# Patient Record
Sex: MALE | Race: WHITE | NOT HISPANIC OR LATINO | URBAN - METROPOLITAN AREA
[De-identification: names, ages, dates, MRNs, and addresses within clinical notes are randomized per-mention and may not be internally consistent; named-entity substitution may affect disease eponyms.]

---

## 2017-09-28 ENCOUNTER — FOLLOW UP ESTABLISHED (OUTPATIENT)
Dept: URBAN - METROPOLITAN AREA CLINIC 24 | Facility: CLINIC | Age: 67
End: 2017-09-28
Payer: COMMERCIAL

## 2017-09-28 DIAGNOSIS — H52.03 HYPERMETROPIA, BILATERAL: ICD-10-CM

## 2017-09-28 DIAGNOSIS — H25.813 COMBINED FORMS OF AGE-RELATED CATARACT, BILATERAL: Primary | ICD-10-CM

## 2017-09-28 PROCEDURE — 92014 COMPRE OPH EXAM EST PT 1/>: CPT | Performed by: OPTOMETRIST

## 2017-09-28 PROCEDURE — 92015 DETERMINE REFRACTIVE STATE: CPT | Performed by: OPTOMETRIST

## 2017-09-28 ASSESSMENT — VISUAL ACUITY
OS: 20/20
OD: 20/25

## 2017-09-28 ASSESSMENT — KERATOMETRY
OS: 44.59
OD: 44.09

## 2017-09-28 ASSESSMENT — INTRAOCULAR PRESSURE
OD: 16
OS: 16

## 2021-12-21 ENCOUNTER — OFFICE VISIT (OUTPATIENT)
Dept: URBAN - METROPOLITAN AREA CLINIC 34 | Facility: CLINIC | Age: 71
End: 2021-12-21
Payer: COMMERCIAL

## 2021-12-21 PROCEDURE — 99204 OFFICE O/P NEW MOD 45 MIN: CPT | Performed by: OPHTHALMOLOGY

## 2021-12-21 PROCEDURE — 92285 EXTERNAL OCULAR PHOTOGRAPHY: CPT | Performed by: OPHTHALMOLOGY

## 2021-12-21 RX ORDER — NEOMYCIN SULFATE, POLYMYXIN B SULFATE AND DEXAMETHASONE 3.5; 10000; 1 MG/G; [USP'U]/G; MG/G
OINTMENT OPHTHALMIC
Qty: 1 | Refills: 1 | Status: ACTIVE
Start: 2021-12-21

## 2021-12-21 NOTE — IMPRESSION/PLAN
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 Plan: The patient demonstrates hamilton tarsal entropion, with lashes abrading the cornea/globe, creating a mechanical keratoconjunctivitis. To resolve this, I recommended entropion reinsertion of the lower lid retractors via conjunctivoplasty w/ rearrangement along with a ATR<10 sq cm to elevate/tighten/stabilize the lower lid, and prevent recurrence of entropion.

## 2022-02-15 ENCOUNTER — ADULT PHYSICAL (OUTPATIENT)
Dept: URBAN - METROPOLITAN AREA CLINIC 24 | Facility: CLINIC | Age: 72
End: 2022-02-15
Payer: COMMERCIAL

## 2022-02-15 DIAGNOSIS — Z01.818 ENCOUNTER FOR OTHER PREPROCEDURAL EXAMINATION: Primary | ICD-10-CM

## 2022-02-15 DIAGNOSIS — H02.032 SENILE ENTROPION OF RIGHT LOWER EYELID: ICD-10-CM

## 2022-02-15 PROCEDURE — 99202 OFFICE O/P NEW SF 15 MIN: CPT | Performed by: PHYSICIAN ASSISTANT

## 2022-03-03 ENCOUNTER — SURGERY (OUTPATIENT)
Dept: URBAN - METROPOLITAN AREA SURGERY 12 | Facility: SURGERY | Age: 72
End: 2022-03-03
Payer: COMMERCIAL

## 2022-03-03 PROCEDURE — 14060 TIS TRNFR E/N/E/L 10 SQ CM/<: CPT | Performed by: OPHTHALMOLOGY

## 2022-03-14 ENCOUNTER — POST-OPERATIVE VISIT (OUTPATIENT)
Dept: URBAN - METROPOLITAN AREA CLINIC 24 | Facility: CLINIC | Age: 72
End: 2022-03-14
Payer: COMMERCIAL

## 2022-03-14 DIAGNOSIS — Z48.89 ENCOUNTER FOR OTHER SPECIFIED SURGICAL AFTERCARE: Primary | ICD-10-CM

## 2022-03-14 PROCEDURE — 99024 POSTOP FOLLOW-UP VISIT: CPT | Performed by: STUDENT IN AN ORGANIZED HEALTH CARE EDUCATION/TRAINING PROGRAM

## 2022-03-14 ASSESSMENT — INTRAOCULAR PRESSURE
OS: 15
OD: 15

## 2022-03-14 NOTE — IMPRESSION/PLAN
Impression: S/P Entropion Repair with Retractor Reinsertion - Lower Lid; adjacent tissue rearrangement less than 10 square cm; conjuntivoplasty with rearrangement OD - 11 Days. Encounter for other specified surgical aftercare  Z48.89. Post operative instructions reviewed - Plan: Cont maxitrol until out RTC 3-4mo prn